# Patient Record
Sex: FEMALE
[De-identification: names, ages, dates, MRNs, and addresses within clinical notes are randomized per-mention and may not be internally consistent; named-entity substitution may affect disease eponyms.]

---

## 2019-08-22 PROBLEM — Z00.00 ENCOUNTER FOR PREVENTIVE HEALTH EXAMINATION: Status: ACTIVE | Noted: 2019-08-22

## 2019-09-03 ENCOUNTER — APPOINTMENT (OUTPATIENT)
Dept: ORTHOPEDIC SURGERY | Facility: CLINIC | Age: 64
End: 2019-09-03
Payer: COMMERCIAL

## 2019-09-03 PROCEDURE — 99213 OFFICE O/P EST LOW 20 MIN: CPT

## 2019-09-05 VITALS
HEART RATE: 93 BPM | HEIGHT: 61 IN | SYSTOLIC BLOOD PRESSURE: 156 MMHG | DIASTOLIC BLOOD PRESSURE: 93 MMHG | BODY MASS INDEX: 27.38 KG/M2 | WEIGHT: 145 LBS

## 2019-09-20 NOTE — CONSULT LETTER
[Dear  ___] : Dear  [unfilled], [I had the pleasure of evaluating your patient, [unfilled].] : I had the pleasure of evaluating your patient, [unfilled]. [FreeTextEntry1] : \par After evaluating your patient and reviewing the studies presented we have come to a mutually agreeable plan. \par \par Please see my note below.\par \par Should you have further questions, please feel free to call and discuss the care of your patient.\par \par Thank you for the confidence of your referral.\par \par Sincerely, \par \par Kit Carvajal MD \par Musculoskeletal Oncology \par [address], [address], \[place] [address] \par [phone] [FreeTextEntry2] : Zak Andujar MD\par [address] [facility]\[place] [address]

## 2019-09-20 NOTE — HISTORY OF PRESENT ILLNESS
[Stable] : stable [FreeTextEntry1] : This is a 64-year-old female who comes in complaining of RIGHT arm mass that has been there for at least 4 or 5 years it causes no pain. She is able to move appropriately. This is her dominant arm. She was always told it was just a lipoma however wanted it checked out further. It has grown minimally over the past few years. [0] : currently ~his/her~ pain is 0 out of 10 [None] : No relieving factors are noted

## 2019-09-20 NOTE — REVIEW OF SYSTEMS
[Chills] : no chills [Feeling Tired] : not feeling tired [Fever] : no fever [Abdominal Pain] : abdominal pain [Heartburn] : heartburn [Anxiety] : anxiety [Nl] : Endocrine

## 2019-09-20 NOTE — PHYSICAL EXAM
[FreeTextEntry1] : On exam the patient stands and odontoid she is able to move around appropriately. Her dominant RIGHT arm has a mass on the inner side of the arm. This is more superficial possibly 5 cm. It is mobile robbery. There is no Tinel's thrills or bruits. There are no skin lesions. She has no lymphadenopathy. Her calves are soft and she is neurovascularly intact. [General Appearance - Well-Appearing] : Well appearing [General Appearance - Well Nourished] : well nourished [Impaired Insight] : Insight and judgment were intact [Oriented To Time, Place, And Person] : Oriented to person, place, and time [Affect] : The affect was normal. [Sclera] : the sclera and conjunctiva were normal [Mood] : the mood was normal [Neck Cervical Mass (___cm)] : no neck mass was observed [Heart Rate And Rhythm] : heart rate was normal and rhythm regular [] : No respiratory distress [Abdomen Soft] : Soft [Normal Station and Gait] : gait and station were normal [Tenderness] : no tenderness [Masses] : masses [Swelling] : swelling [Skin Changes - Describe changes:] : No skin changes noted [UE Motor Strength Normal unless otherwise noted:] : 5/5 strength in bilateral upper extremities unless otherwise noted. [Full UE ROM unless otherwise noted:] : Full range of motion unless otherwise noted. [Normal] : Sensation intact to light touch. [2+] : right 2+

## 2019-09-20 NOTE — DISCUSSION/SUMMARY
[All Questions Answered] : Patient (and family) had all questions answered to an agreeable level of satisfaction [Interested in Proceeding] : Patient (and family) expressed understanding and interest in proceeding with the plan as outlined [de-identified] : Patient has approximately 5 cm soft tissue mass. We do not have any imaging anesthetic an MRI would be appropriate to look at this and see whether it is lipomatous or something else. She will return to the MRI is done we can discuss possible surgical options.\par \par If imaging was ordered, the patient was told to make an appointment to review findings right after all imaging is completed.\par \par We discussed risks, benefits and alternatives. Rationale of care was reviewed and all questions were answered. Patient (and family) had all questions answered to her degree of the level of satisfaction. Patient (and family) expressed understanding and interest in proceeding with the plan as outlined.\par \par \par \par \par This note was done with a voice recognition transcription software and any typos are related to this rather than medical error.

## 2020-06-02 ENCOUNTER — APPOINTMENT (OUTPATIENT)
Dept: ORTHOPEDIC SURGERY | Facility: CLINIC | Age: 65
End: 2020-06-02
Payer: COMMERCIAL

## 2020-06-02 PROCEDURE — 99215 OFFICE O/P EST HI 40 MIN: CPT

## 2020-06-05 DIAGNOSIS — Z87.891 PERSONAL HISTORY OF NICOTINE DEPENDENCE: ICD-10-CM

## 2020-06-05 DIAGNOSIS — Z86.79 PERSONAL HISTORY OF OTHER DISEASES OF THE CIRCULATORY SYSTEM: ICD-10-CM

## 2020-06-05 DIAGNOSIS — Z87.39 PERSONAL HISTORY OF OTHER DISEASES OF THE MUSCULOSKELETAL SYSTEM AND CONNECTIVE TISSUE: ICD-10-CM

## 2020-06-05 DIAGNOSIS — Z60.2 PROBLEMS RELATED TO LIVING ALONE: ICD-10-CM

## 2020-06-05 DIAGNOSIS — Z87.19 PERSONAL HISTORY OF OTHER DISEASES OF THE DIGESTIVE SYSTEM: ICD-10-CM

## 2020-06-05 RX ORDER — MESALAMINE 500 MG/1
500 CAPSULE ORAL
Refills: 0 | Status: ACTIVE | COMMUNITY

## 2020-06-05 SDOH — SOCIAL STABILITY - SOCIAL INSECURITY: PROBLEMS RELATED TO LIVING ALONE: Z60.2

## 2020-06-05 NOTE — DISCUSSION/SUMMARY
[All Questions Answered] : Patient (and family) had all questions answered to an agreeable level of satisfaction [Interested in Proceeding] : Patient (and family) expressed understanding and interest in proceeding with the plan as outlined [de-identified] : Patient is thinking about surgery.  My recommendations for her are to have this resected as it is bothering her somewhat.  She understands this is an ambulatory surgery.  We will be able to resect the mass and send it for pathology.  I will do this based on her availability and convenience.  If she did not want surgery then I would follow her up in 6 months for clinical check.\par \par If imaging was ordered, the patient was told to make an appointment to review findings right after all imaging is completed.\par \par We discussed risks, benefits and alternatives. Rationale of care was reviewed and all questions were answered. Patient (and family) had all questions answered to her degree of the level of satisfaction. Patient (and family) expressed understanding and interest in proceeding with the plan as outlined.\par \par \par \par \par This note was done with a voice recognition transcription software and any typos are related to this rather than medical error. Surgical risks reviewed. Patient (and family) had all questions answered to an agreeable level of satisfaction. Patient (and family) expressed understanding and interest in proceeding with the plan as outlined.  \par

## 2020-06-05 NOTE — HISTORY OF PRESENT ILLNESS
[Stable] : stable [0] : currently ~his/her~ pain is 0 out of 10 [FreeTextEntry1] : Patient is back today to review her MRI.  The mass is about the same as it was before.  It is still bothering her from a cosmetic point of view.  It is minimally painful.  She thinks there is minimal growth in time.  She was not able to come back because she was not able to  the disc however now she is here to review her imaging. [Bending] : not exacerbated by bending

## 2020-06-05 NOTE — DATA REVIEWED
[Imaging Present] : Present [de-identified] : MRI Scan from March 6th 2020 shows a stable lipomatous mass in the medial tissues of the arm below the axilla. THere is no enhancement or irregularities to suggest atypical mass or sarcoma.

## 2020-06-05 NOTE — REVIEW OF SYSTEMS
[Heartburn] : heartburn [Abdominal Pain] : abdominal pain [Anxiety] : anxiety [Nl] : Hematologic/Lymphatic [Chills] : no chills [Feeling Tired] : not feeling tired [Fever] : no fever

## 2020-06-05 NOTE — PHYSICAL EXAM
[General Appearance - Well-Appearing] : Well appearing [General Appearance - Well Nourished] : well nourished [Oriented To Time, Place, And Person] : Oriented to person, place, and time [Affect] : The affect was normal. [Impaired Insight] : Insight and judgment were intact [Mood] : the mood was normal [Neck Cervical Mass (___cm)] : no neck mass was observed [Sclera] : the sclera and conjunctiva were normal [] : No respiratory distress [Heart Rate And Rhythm] : heart rate was normal and rhythm regular [Normal Station and Gait] : gait and station were normal [Abdomen Soft] : Soft [Masses] : masses [Swelling] : swelling [Full UE ROM unless otherwise noted:] : Full range of motion unless otherwise noted. [UE Motor Strength Normal unless otherwise noted:] : 5/5 strength in bilateral upper extremities unless otherwise noted. [Normal] : Sensation intact to light touch. [2+] : left 2+ [FreeTextEntry1] : On exam she has a hard mass approximately same as before.  It is proximally 7 cm in size on the medial side of her inner arm.  It is under the skin rubbery mobile nontender with no Tinel's or thrills or bruits associated with it.  She remains neurovascularly intact with full range of motion with good stability. [Tenderness] : no tenderness [Skin Changes - Describe changes:] : No skin changes noted

## 2020-09-15 ENCOUNTER — APPOINTMENT (OUTPATIENT)
Dept: ORTHOPEDIC SURGERY | Facility: CLINIC | Age: 65
End: 2020-09-15
Payer: COMMERCIAL

## 2020-09-15 DIAGNOSIS — R22.31 LOCALIZED SWELLING, MASS AND LUMP, RIGHT UPPER LIMB: ICD-10-CM

## 2020-09-15 PROCEDURE — 99213 OFFICE O/P EST LOW 20 MIN: CPT

## 2020-09-15 NOTE — DISCUSSION/SUMMARY
[All Questions Answered] : Patient (and family) had all questions answered to an agreeable level of satisfaction [Interested in Proceeding] : Patient (and family) expressed understanding and interest in proceeding with the plan as outlined [de-identified] : Patient has a 7 or 8 cm lipomatous mass.  We discussed that anything more than 5 cm in general is recommended to come out however this is superficial.  Furthermore the patient does not want surgery.  She understands it may get bigger and has a small chance of turning to something different.  I will see her again in 6 months for routine follow-up.  She is pain-free right now so she will not have surgery she changes her mind she can call for evaluation.\par \par If imaging was ordered, the patient was told to make an appointment to review findings right after all imaging is completed.\par \par We discussed risks, benefits and alternatives. Rationale of care was reviewed and all questions were answered. Patient (and family) had all questions answered to her degree of the level of satisfaction. Patient (and family) expressed understanding and interest in proceeding with the plan as outlined.\par \par \par \par \par This note was done with a voice recognition transcription software and any typos are related to this rather than medical error. Surgical risks reviewed. Patient (and family) had all questions answered to an agreeable level of satisfaction. Patient (and family) expressed understanding and interest in proceeding with the plan as outlined.  \par

## 2020-09-15 NOTE — REVIEW OF SYSTEMS
[Chills] : no chills [Abdominal Pain] : abdominal pain [Feeling Tired] : not feeling tired [Fever] : no fever [Heartburn] : heartburn [Anxiety] : anxiety [Nl] : Hematologic/Lymphatic

## 2020-09-15 NOTE — HISTORY OF PRESENT ILLNESS
[Stable] : stable [FreeTextEntry1] : Patient is unchanged from before.  She says the mass is the same.  She does not want have surgery because her doctor told her not to.  Her doctor tells her that these things do not get bigger and she is not having any pain. [0] : currently ~his/her~ pain is 0 out of 10

## 2020-09-15 NOTE — PHYSICAL EXAM
[FreeTextEntry1] : Unchanged from befor: On exam she has a mass approximately same as before. It is proximally 7 cm in size on the medial side of her inner arm. It is under the skin rubbery mobile nontender with no Tinel's or thrills or bruits associated with it. She remains neurovascularly intact with full range of motion with good stability. \par  [General Appearance - Well-Appearing] : Well appearing [General Appearance - Well Nourished] : well nourished [Normal Station and Gait] : gait and station were normal [Tenderness] : no tenderness [Swelling] : swelling [Masses] : masses [Skin Changes - Describe changes:] : No skin changes noted [Full UE ROM unless otherwise noted:] : Full range of motion unless otherwise noted. [UE Motor Strength Normal unless otherwise noted:] : 5/5 strength in bilateral upper extremities unless otherwise noted. [Normal] : Sensation intact to light touch. [2+] : left 2+